# Patient Record
Sex: FEMALE | Race: BLACK OR AFRICAN AMERICAN | NOT HISPANIC OR LATINO | URBAN - METROPOLITAN AREA
[De-identification: names, ages, dates, MRNs, and addresses within clinical notes are randomized per-mention and may not be internally consistent; named-entity substitution may affect disease eponyms.]

---

## 2020-10-22 ENCOUNTER — AMBULATORY - HEALTHEAST (OUTPATIENT)
Dept: OBGYN | Facility: CLINIC | Age: 25
End: 2020-10-22

## 2020-10-22 DIAGNOSIS — Z33.1 PREGNANT STATE, INCIDENTAL: ICD-10-CM

## 2020-10-28 ENCOUNTER — COMMUNICATION - HEALTHEAST (OUTPATIENT)
Dept: TELEHEALTH | Facility: CLINIC | Age: 25
End: 2020-10-28

## 2020-10-28 ENCOUNTER — AMBULATORY - HEALTHEAST (OUTPATIENT)
Dept: LAB | Facility: CLINIC | Age: 25
End: 2020-10-28

## 2020-10-28 DIAGNOSIS — Z33.1 PREGNANT STATE, INCIDENTAL: ICD-10-CM

## 2020-10-29 LAB
SARS-COV-2 PCR COMMENT: NORMAL
SARS-COV-2 RNA SPEC QL NAA+PROBE: NEGATIVE
SARS-COV-2 VIRUS SPECIMEN SOURCE: NORMAL

## 2020-10-30 ENCOUNTER — COMMUNICATION - HEALTHEAST (OUTPATIENT)
Dept: SCHEDULING | Facility: CLINIC | Age: 25
End: 2020-10-30

## 2020-11-01 ENCOUNTER — ANESTHESIA - HEALTHEAST (OUTPATIENT)
Dept: OBGYN | Facility: HOSPITAL | Age: 25
End: 2020-11-01

## 2020-11-06 ENCOUNTER — COMMUNICATION - HEALTHEAST (OUTPATIENT)
Dept: ADMINISTRATIVE | Facility: CLINIC | Age: 25
End: 2020-11-06

## 2021-01-08 ENCOUNTER — COMMUNICATION - HEALTHEAST (OUTPATIENT)
Dept: ADMINISTRATIVE | Facility: CLINIC | Age: 26
End: 2021-01-08

## 2021-06-12 NOTE — ANESTHESIA PREPROCEDURE EVALUATION
Anesthesia Evaluation      Patient summary reviewed   No history of anesthetic complications     Airway   Mallampati: II   Pulmonary - negative ROS and normal exam                          Cardiovascular - negative ROS and normal exam   Neuro/Psych - negative ROS     Endo/Other    (+) pregnant     GI/Hepatic/Renal - negative ROS      Other findings: Physiologic changes of pregnancy      Dental - normal exam                        Anesthesia Plan  Planned anesthetic: epidural  Patient requests labor epidural. Chart reviewed, including labs. Reviewed options and risks with the patient, including but not limited to: bleeding, infection, damage to tissues under the skin (nerves, muscles, blood vessels), hypotension, headache, and epidural failure. Questions answered, consent signed. Patient agrees to elective labor epidural.       ASA 2     Anesthetic plan and risks discussed with: patient and spouse    Post-op plan: routine recovery      10/28/2020 covid pcr negative    Results for orders placed or performed during the hospital encounter of 11/01/20   OB External Results   Result Value Ref Range    ABO/Rh External Result O Positive     Antibody External Result No antibodies detected      Rubella External Result Immune     RPR External Result Non-Reactive    OB External Results   Result Value Ref Range    HBsAg External Result Non-reactive     HIV External Result Non-Reactive    OB External Results   Result Value Ref Range    Group B strep External Result Positive    OB External Results   Result Value Ref Range    Hemoglobin External Result 10.3     Platelet Count External Result 252

## 2021-06-12 NOTE — ANESTHESIA PROCEDURE NOTES
Epidural Block    Patient location during procedure: OB  Time Called: 11/1/2020 1:19 PM  Reason for Block:labor epidural  Staffing:  Performing  Anesthesiologist: Aron Price MD  Preanesthetic Checklist  Completed: patient identified, risks, benefits, and alternatives discussed, timeout performed, consent obtained, at patient's request, airway assessed, oxygen available, suction available, emergency drugs available and hand hygiene performed  Procedure  Patient position: sitting  Prep: ChloraPrep and site prepped and draped  Patient monitoring: continuous pulse oximetry, heart rate and blood pressure  Approach: midline  Location: L3-L4  Injection technique: NELI saline  Number of Attempts:1  Needle  Needle type: Advitech   Needle gauge: 18 G     Catheter in Space: 6  Assessment  Sensory level:  No complications      Additional Notes:  Patient requests labor epidural. Chart reviewed, including labs. Reviewed options and risks with the patient, including but not limited to: bleeding, infection, damage to tissues under the skin (nerves, muscles, blood vessels), hypotension, headache, and epidural failure. Questions answered, consent signed. Patient agrees to elective labor epidural.     Hands washed, sterile technique used, including scrub hat, mask, eye protection, and gloves.     Test dose negative for heme/SAB.

## 2021-06-12 NOTE — ANESTHESIA POSTPROCEDURE EVALUATION
Patient: Cecelia Wyatt  * No procedures listed *  Anesthesia type: epidural    Patient location: Labor and Delivery  Last vitals: No vitals data found for the desired time range.    Post vital signs: stable  Level of consciousness: awake and responds to simple questions  Post-anesthesia pain: pain controlled  Post-anesthesia nausea and vomiting: no  Pulmonary: unassisted, return to baseline  Cardiovascular: stable and blood pressure at baseline  Hydration: adequate  Anesthetic events: no    QCDR Measures:  ASA# 11 - Sindhu-op Cardiac Arrest: ASA11B - Patient did NOT experience unanticipated cardiac arrest  ASA# 12 - Sindhu-op Mortality Rate: ASA12B - Patient did NOT die  ASA# 13 - PACU Re-Intubation Rate: ASA13B - Patient did NOT require a new airway mgmt  ASA# 10 - Composite Anes Safety: ASA10A - No serious adverse event    Additional Notes:  Denies headache and point back tenderness. No issues with urination or ambulation

## 2021-06-12 NOTE — TELEPHONE ENCOUNTER
Pt has had a clogged milk duct for about 3 days. She has tried warm packs, and  Massaging with no relief. She is not having any fever or chills. Please call to discuss help for some relief.

## 2021-06-12 NOTE — TELEPHONE ENCOUNTER
Returned call to Franklyn for clogged milk duct on her left breast in the last 2-3 days. No redness. No fevers. It is firm to touch and tender. She reports it is the size of a golf ball. She does not feel a lot of engorgement at this time. She reports infant is feeding frequently. She is using warm compresses, massage and pumping, but the size of the blocked duct or mass has not decreased.    Given size of the mass on her breast, I recommended to seek clinic evaluation. Franklyn plans to call her primary care provider from [facility] or her OB provider.    Did discuss ways to ways manage blocked milk ducts with massage, warm compresses, dangle nursing, and nursing position in a way infant's chin points to the blocked duct. Also discussed lecithin 1200 mg four times a day, if there is no improvement in 2-3 days.    Cecelia verbalizes understanding and plans to seek clinic evaluation today due to the size of the blocked milk duct/mass on her left breast.    Mary John, APRN, CPNP, IBCLC  [facility]  [facility]  11/6/2020, 1:21 PM

## 2021-06-13 NOTE — TELEPHONE ENCOUNTER
Returned Cecelia's call.  She feels she is having milk overproduction, and baby is choking while nursing.  Also baby feeds for about 5 minutes and is then finished.  Nurses with big gulps. Cecelia reports she started pumping early, because baby was not eating well in early days, so this may have resulted in oversupply.   Has now decreased pumping to just once daily for comfort, and yielding 2-3 oz.  Reports latch is good, no nipple pain.  Baby feeding every 2 hours, diapers are wet with every feeding and have a little bit of yellow stool in every diaper.      A:  Mother at one week postpartum with abundant milk supply and strong letdown    P:  Discussed ways to manage strong letdown:  Nursing in laid-back or sidelying positions, or using a hand as a dam to slow flow.  Encouraged Cecelia to continue to limit pumping.  To call with any further questions or concerns.

## 2021-06-14 NOTE — TELEPHONE ENCOUNTER
Telephone call:    Returned phone call to Cecelia and answered questions regarding return to work, specifically related to introducing the bottle. States baby is gagging on bottle. Also won't take a pacifier. Baby is 2.5 months old. She will return to work in 2 wks. Her mother or her boyfriend will provide care to baby while she is at work. Does not think it would be possible for them to bring baby to her during the day to nurse at all.     Disc ways to optimize baby's bottle feeding and ways to encourage her to take a bottle. Online resources dicussed. Enc her to consider a shorter shaft nipple.  Questions about return to work as it impacts milk supply. Disc idea of reverse cycling if baby is not taking much nutrition from bottles while they are . Reviewed outpatient lactation resources including in person or video appointments if she continues to have questions or concerns.     TIM Mckeon, CNM, IBCLC  [facility]  Midwifery

## 2021-06-16 PROBLEM — Z34.80 ENCOUNTER FOR SUPERVISION OF OTHER NORMAL PREGNANCY, UNSPECIFIED TRIMESTER: Status: ACTIVE | Noted: 2020-03-24

## 2021-06-16 PROBLEM — Z34.90 PREGNANT: Status: ACTIVE | Noted: 2020-11-01
